# Patient Record
(demographics unavailable — no encounter records)

---

## 2024-11-25 NOTE — DATA REVIEWED
[FreeTextEntry1] : Audiogram was ordered due to concerns for possible ETD I personally reviewed and interpreted the audiogram. I explained the results of the audiogram to the family. Tymps:  Type B bilaterally Audio: SFs borderline , 2-4kHz, SDT 15

## 2024-11-25 NOTE — HISTORY OF PRESENT ILLNESS
[de-identified] : 11 month boy presents with complaints of ear infections.  Has had 3-4 infections over the past 6 months. Most recent infection was  currently. Had AOM a few weeks ago and 2 week showed OME and they started another abx.  Does NOT seem to respond to antibiotics and seems NOT to clear fluid between infections.  Had needed multiple rounds of abx for ear infections.  Was babbling and then stopped talking.   no speech eval Passed [facility] 36 weeks gestation.  NICU for 3 days.  No FMHx of hearing loss

## 2024-11-25 NOTE — PHYSICAL EXAM
[1+] : 1+ [Clear to Auscultation] : lungs were clear to auscultation bilaterally [Normal Gait and Station] : normal gait and station [Normal muscle strength, symmetry and tone of facial, head and neck musculature] : normal muscle strength, symmetry and tone of facial, head and neck musculature [Normal] : no cervical lymphadenopathy [Exposed Vessel] : left anterior vessel not exposed [Wheezing] : no wheezing [Increased Work of Breathing] : no increased work of breathing with use of accessory muscles and retractions [de-identified] : OME, retracted [de-identified] : OME, retracted [de-identified] : lip tie

## 2024-11-25 NOTE — ASSESSMENT
[FreeTextEntry1] : 11 month male History of ear infections.  Discussed options including ear tubes versus observation and conservative therapy.  Discussed that given current ear infection history, they don't quite meet AAO-HNS guidelines and would consider observation at this time.   Discussed at length that ear fluid itself is a result of a mechanical problem due to swelling and inflammation after URIs and that if not infected fluid that we often don't treat with antibiotics.  The underlying issues is eustachian tube dysfunction which can be transient in which we just wait for viral illnesses to run their course.  If the ETD is chronic that is when we discuss possible ear tubes.  Unfortunately, there is no good evidence about medications to help improve transient ETD but some have tried nasal sprays including steroids and allergy meds.  Discussed that when they have ear fluid during a URI we recommend waiting 2-3 days and treat supportively and with tylenol or motrin. If the infections persists past that time, can consider oral abx.   Incidental lip tie. monitor for now. Consider release if goes to OR.  RTC 3 months with audio.

## 2024-11-25 NOTE — REVIEW OF SYSTEMS
[Recurrent Ear Infections] : recurrent ear infections [Negative] : Heme/Lymph [de-identified] : see HPI